# Patient Record
Sex: FEMALE | Race: WHITE
[De-identification: names, ages, dates, MRNs, and addresses within clinical notes are randomized per-mention and may not be internally consistent; named-entity substitution may affect disease eponyms.]

---

## 2020-12-05 ENCOUNTER — HOSPITAL ENCOUNTER (EMERGENCY)
Dept: HOSPITAL 41 - JD.ED | Age: 55
Discharge: HOME | End: 2020-12-05
Payer: MEDICARE
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
## 2020-12-05 DIAGNOSIS — Z88.5: ICD-10-CM

## 2020-12-05 DIAGNOSIS — Z88.8: ICD-10-CM

## 2020-12-05 DIAGNOSIS — S52.611A: ICD-10-CM

## 2020-12-05 DIAGNOSIS — S52.501A: Primary | ICD-10-CM

## 2020-12-05 NOTE — EDM.PDOC
ED HPI GENERAL MEDICAL PROBLEM





- General


Chief Complaint: Upper Extremity Injury/Pain


Stated Complaint: RT WRIST INJURY


Time Seen by Provider: 12/05/20 15:15





- History of Present Illness


INITIAL COMMENTS - FREE TEXT/NARRATIVE: 





55-year-old female presents the emergency room with right arm pain.





Shortly before arrival the patient was ice skating.  She has an ice skating in 

15 years.  Her skate slipped out from underneath her and she fell backwards on 

an outstretched arm.  Patient denies any other injury associated with this most 

unfortunate event.  Patient denies significant medical problems however she is a

smoker.


  ** Right Wrist


Pain Score (Numeric/FACES): 9





- Related Data


                                    Allergies











Allergy/AdvReac Type Severity Reaction Status Date / Time


 


aspartame Allergy  Cannot Verified 12/05/20 14:29





   Remember  


 


codeine Allergy  Cannot Verified 12/05/20 14:29





   Remember  


 


generic medications Allergy  Other Uncoded 12/05/20 14:29











Home Meds: 


                                    Home Meds





Acetaminophen/HYDROcodone [Norco 325-5 MG] 1 - 2 tab PO Q6H PRN #20 tablet 

12/05/20 [Rx]











Past Medical History


Cardiovascular History: Reports: Heart Murmur


Psychiatric History: Reports: Anxiety, PTSD





Social & Family History





- Tobacco Use


Tobacco Use Status *Q: Unknown Ever Used Tobacco





- Caffeine Use


Caffeine Use: Reports: None





- Recreational Drug Use


Recreational Drug Use: No





Review of Systems





- Review of Systems


Review Of Systems: See Below


Constitutional: Reports: No Symptoms


Respiratory: Reports: No Symptoms


Cardiovascular: Reports: No Symptoms


GI/Abdominal: Reports: No Symptoms


Musculoskeletal: Reports: Other (See history of present illness)


Neurological: Reports: No Symptoms





ED EXAM, GENERAL





- Physical Exam


Exam: See Below


Exam Limited By: No Limitations


General Appearance: Alert, No Apparent Distress (She has discomfort with any 

sort of activity with this upper extremity)


Head: Atraumatic, Normocephalic


Neck: Normal Inspection, Supple, Non-Tender, Full Range of Motion


Respiratory/Chest: No Respiratory Distress, Lungs Clear, Normal Breath Sounds


Cardiovascular: Regular Rate, Rhythm, No Edema, No Murmur


Extremities: Other





ED TRAUMA EXTREMITY PROCEDURES





- Splinting


  ** Right Upper Extremity


Splint Site: r fore arm


Pre-Procedure NV Status: Normal


Post-Procedure NV Status: Normal


Splint Material: Fiberglass


Splint Design: Sugar Tong


Applied & Form Fitted By: Provider, Nurse


Provider Post-Splint Application NV Check: NV Status Normal


Complications: No


Progress/Comments: 





Felt better after the splint was applied we will put her in a sling to help with

 this





Course





- Vital Signs


Last Recorded V/S: 


                                Last Vital Signs











Temp  36.1 C   12/05/20 14:30


 


Pulse  70   12/05/20 14:30


 


Resp  16   12/05/20 14:30


 


BP  114/70   12/05/20 14:30


 


Pulse Ox  99   12/05/20 14:30














- Re-Assessments/Exams


Free Text/Narrative Re-Assessment/Exam: 





12/05/20 16:32


X-ray shows a dorsally angulated, approximately 20 to 25 degrees, and minimally 

impacted distal radius fracture with an associated ulnar styloid fracture.  Case

 discussed with Dr. Cárdenas, on-call orthopedic surgeon for bone and joint.  He 

was contacted through [facility] in [place] 1 call.  His recommendation is a 

sugar tong splint and a sling.  And then call either the [facility] or Dr. Gonzales's office here for follow-up on Monday or Tuesday.





Departure





- Departure


Time of Disposition: 16:38


Disposition: Home, Self-Care 01


Clinical Impression: 


 Closed fracture of right distal radius








- Discharge Information


Referrals: 


Ina Lima NP [Primary Care Provider] - 


Brice Gonzales MD [Physician] - 


David Cárdenas MD [Ordering Only Provider] - 


Forms:  ED Department Discharge


Additional Instructions: 


Return to the emergency room with any questions problems or worsening symptoms.





Monday morning call Dr. Gonzales here in [place], or Dr. Cárdenas at the [facility] in [place].  You should be seen on Monday or Tuesday.





Tylenol for discomfort.  I am also giving you some Norco, or hydrocodone 

acetaminophen.  Use this for the more severe pain however keep in mind that this

medication can cause constipation so take a good stool softener and more 

importantly allow 12+ hours after using that medication before driving or 

returning to work.  So watch your total Tylenol intake the pain pills all have 

325 mg of Tylenol at home and your daily Tylenol intake should not exceed 4000 

mg.





Sepsis Event Note (ED)





- Evaluation


Sepsis Screening Result: No Definite Risk





- Focused Exam


Vital Signs: 


                                   Vital Signs











  Temp Pulse Resp BP Pulse Ox


 


 12/05/20 14:30  36.1 C  70  16  114/70  99

## 2020-12-05 NOTE — CR
Right wrist: 4 views of the right wrist were obtained

 

Comparison: No prior wrist exam.

 

Findings:

Osseous: Fracture is identified within the distal radius.  Fracture 

shows mild posterior displacement distally as well as posterior 

impaction.  Fracture is noted within the ulnar styloid process.

 

No additional bony abnormality is appreciated.

 

Soft tissues: Diffuse soft tissue swelling is noted.

 

Impression:

1.  Distal radial fracture as noted above.

 

Diagnostic code #3